# Patient Record
Sex: MALE | Race: BLACK OR AFRICAN AMERICAN
[De-identification: names, ages, dates, MRNs, and addresses within clinical notes are randomized per-mention and may not be internally consistent; named-entity substitution may affect disease eponyms.]

---

## 2019-06-18 ENCOUNTER — HOSPITAL ENCOUNTER (EMERGENCY)
Dept: HOSPITAL 97 - ER | Age: 44
Discharge: HOME | End: 2019-06-18
Payer: SELF-PAY

## 2019-06-18 DIAGNOSIS — R42: ICD-10-CM

## 2019-06-18 DIAGNOSIS — T67.6XXA: Primary | ICD-10-CM

## 2019-06-18 PROCEDURE — 81003 URINALYSIS AUTO W/O SCOPE: CPT

## 2019-06-18 PROCEDURE — 99283 EMERGENCY DEPT VISIT LOW MDM: CPT

## 2019-06-18 NOTE — ER
Nurse's Notes                                                                                     

 [facility]                                                                 

Name: Dhaval Loyd                                                                               

Age: 43 yrs                                                                                       

Sex: Male                                                                                         

: 1975                                                                                   

MRN: [id number]                                                                                   

Arrival Date: 2019                                                                          

Time: 11:23                                                                                       

Account#: [id number]                                                                            

Bed 20                                                                                            

Private MD:                                                                                       

Diagnosis: Dizziness and giddiness;Heat fatigue, transient                                        

                                                                                                  

Presentation:                                                                                     

                                                                                             

11:23 Presenting complaint: Patient states: i was at work and was assigned on another           

      facility, they don't have a good ventilation, i started feeling dizzy and i wanted to       

      throw up; i requested to see a doctor;. Transition of care: patient was not received        

      from another setting of care. Onset of symptoms was 2019. Risk Assessment: Do      

      you want to hurt yourself or someone else? Patient reports no desire to harm self or        

      others. Initial Sepsis Screen: Does the patient meet any 2 criteria? No. Patient's          

      initial sepsis screen is negative. Does the patient have a suspected source of              

      infection? No. Patient's initial sepsis screen is negative. Care prior to arrival: None.    

11:23 Method Of Arrival: Ambulatory                                                             

11:23 Acuity: KESHAV 4                                                                             

                                                                                                  

Triage Assessment:                                                                                

11:36 General: Appears in no apparent distress. slender, Behavior is calm, cooperative,       tw2 

      appropriate for age.                                                                        

11:44 Pain: Denies pain.                                                                      tw2 

                                                                                                  

Historical:                                                                                       

- Allergies:                                                                                      

11:25 No Known Allergies;                                                                       

- Home Meds:                                                                                      

11:25 None [Active];                                                                            

- PMHx:                                                                                           

11:25 None;                                                                                     

- PSHx:                                                                                           

11:25 None;                                                                                     

                                                                                                  

- Immunization history:: Adult Immunizations.                                                     

- Social history:: Smoking status: .                                                              

- Ebola Screening: : Patient denies travel to an Ebola-affected area in the 21 days               

  before illness onset.                                                                           

- Family history:: not pertinent.                                                                 

                                                                                                  

                                                                                                  

Screenin:35 Abuse screen: Denies threats or abuse. Nutritional screening: No deficits noted.        tw2 

      Tuberculosis screening: No symptoms or risk factors identified. Fall Risk None              

      identified.                                                                                 

                                                                                                  

Assessment:                                                                                       

11:35 General: Appears in no apparent distress. slender, Behavior is calm, cooperative,       tw2 

      appropriate for age. Pain: Denies pain. Neuro: Level of Consciousness is awake, alert,      

      obeys commands, Oriented to person, place, time, situation. Cardiovascular: Patient's       

      skin is warm and dry. Respiratory: Airway is patent Respiratory effort is even,             

      unlabored, Respiratory pattern is regular, symmetrical. GI: No signs and/or symptoms        

      were reported involving the gastrointestinal system. : No signs and/or symptoms were      

      reported regarding the genitourinary system. EENT: No signs and/or symptoms were            

      reported regarding the EENT system. Derm: No signs and/or symptoms reported regarding       

      the dermatologic system. Musculoskeletal: Range of motion: intact in all extremities.       

12:03 Reassessment: Patient appears in no apparent distress at this time. No changes from     tw2 

      previously documented assessment. Patient and/or family updated on plan of care and         

      expected duration. Pain level reassessed. Patient is alert, oriented x 3, equal             

      unlabored respirations, skin warm/dry/pink.                                                 

                                                                                                  

Vital Signs:                                                                                      

11:25  / 71; Pulse 51; Resp 18; Temp 98.2(O); Pulse Ox 99% on R/A; Weight 86.18 kg;     hj  

      Height 6 ft. 1 in. (185.42 cm); Pain 0/10;                                                  

11:25 Body Mass Index 25.07 (86.18 kg, 185.42 cm)                                               

                                                                                                  

ED Course:                                                                                        

11:23 Patient arrived in ED.                                                                  mr  

11:24 Triage completed.                                                                       hj  

11:25 Arm band placed on right wrist.                                                           

11:33 Jordan Del Real MD is Attending Physician.                                             [facility] 

11:35 Isabella Joe, RN is Primary Nurse.                                                        tw2 

11:35 Bed in low position. Call light in reach.                                               tw2 

11:44 Urine Dipstick--Ancillary (enter results) Sent.                                         tw2 

12:04 No provider procedures requiring assistance completed. Patient did not have IV access   tw2 

      during this emergency room visit.                                                           

                                                                                                  

Administered Medications:                                                                         

No medications were administered                                                                  

                                                                                                  

                                                                                                  

Outcome:                                                                                          

11:55 Discharge ordered by MD.                                                                [facility] 

12:04 Discharged to home ambulatory.                                                          tw2 

12:04 Condition: stable                                                                           

12:04 Discharge instructions given to patient, Instructed on discharge instructions, follow       

      up and referral plans. Demonstrated understanding of instructions, follow-up care.          

12:04 Patient left the ED.                                                                    tw2 

                                                                                                  

Signatures:                                                                                       

Jordan Del Real MD MD cha Rivera, Mary                                 mr MylesCristopher RN RN hj Wise, Tara, RN                          RN   tw2                                                  

                                                                                                  

Corrections: (The following items were deleted from the chart)                                    

12:01 11:23 Acuity: KESHAV 3 [facility]  

                                                                                                  

**************************************************************************************************

## 2019-06-18 NOTE — EDPHYS
Physician Documentation                                                                           

 [facility]                                                                 

Name: Dhaval Loyd                                                                               

Age: 43 yrs                                                                                       

Sex: Male                                                                                         

: 1975                                                                                   

MRN: [id number]                                                                                   

Arrival Date: 2019                                                                          

Time: 11:23                                                                                       

Account#: [id number]                                                                            

Bed 20                                                                                            

Private MD:                                                                                       

ED Physician Jordan Del Rael                                                                      

HPI:                                                                                              

                                                                                             

11:52 This 43 yrs old Black Male presents to ER via Ambulatory with complaints of Dizziness.  markel 

11:52 The patient presents with dizziness. Onset: The symptoms/episode began/occurred just    markel 

      prior to arrival. Context: occurred while the patient was working, in heat, no              

      ventilation. Modifying factors: The symptoms are alleviated by getting in cool              

      enviroment. Associated signs and symptoms: The patient has no apparent associated signs     

      or symptoms. Severity of symptoms: At their worst the symptoms were mild in the             

      emergency department the symptoms are unchanged. Patient's baseline: Neuro: alert and       

      fully oriented. The patient has not experienced similar symptoms in the past.               

                                                                                                  

Historical:                                                                                       

- Allergies:                                                                                      

11:25 No Known Allergies;                                                                     hj  

- Home Meds:                                                                                      

11:25 None [Active];                                                                          hj  

- PMHx:                                                                                           

11:25 None;                                                                                   hj  

- PSHx:                                                                                           

11:25 None;                                                                                   hj  

                                                                                                  

- Immunization history:: Adult Immunizations.                                                     

- Social history:: Smoking status: .                                                              

- Ebola Screening: : Patient denies travel to an Ebola-affected area in the 21 days               

  before illness onset.                                                                           

- Family history:: not pertinent.                                                                 

                                                                                                  

                                                                                                  

ROS:                                                                                              

11:52 Constitutional: Negative for fever, chills, and weight loss, Eyes: Negative for injury, markel 

      pain, redness, and discharge, ENT: Negative for injury, pain, and discharge, Neck:          

      Negative for injury, pain, and swelling, Cardiovascular: Negative for chest pain,           

      palpitations, and edema, Respiratory: Negative for shortness of breath, cough,              

      wheezing, and pleuritic chest pain, Abdomen/GI: Negative for abdominal pain, nausea,        

      vomiting, diarrhea, and constipation, Back: Negative for injury and pain, : Negative      

      for injury, bleeding, discharge, and swelling, MS/Extremity: Negative for injury and        

      deformity, Skin: Negative for injury, rash, and discoloration, Psych: Negative for          

      depression, anxiety, suicide ideation, homicidal ideation, and hallucinations,              

      Allergy/Immunology: Negative for hives, rash, and allergies, Endocrine: Negative for        

      neck swelling, polydipsia, polyuria, polyphagia, and marked weight changes,                 

      Hematologic/Lymphatic: Negative for swollen nodes, abnormal bleeding, and unusual           

      bruising.                                                                                   

11:52 Neuro: Positive for dizziness.                                                              

                                                                                                  

Exam:                                                                                             

11:52 Constitutional:  This is a well developed, well nourished patient who is awake, alert,  markel 

      and in no acute distress. Head/Face:  Normocephalic, atraumatic. Eyes:  Pupils equal        

      round and reactive to light, extra-ocular motions intact.  Lids and lashes normal.          

      Conjunctiva and sclera are non-icteric and not injected.  Cornea within normal limits.      

      Periorbital areas with no swelling, redness, or edema. ENT:  Nares patent. No nasal         

      discharge, no septal abnormalities noted.  Tympanic membranes are normal and external       

      auditory canals are clear.  Oropharynx with no redness, swelling, or masses, exudates,      

      or evidence of obstruction, uvula midline.  Mucous membranes moist. Neck:  Trachea          

      midline, no thyromegaly or masses palpated, and no cervical lymphadenopathy.  Supple,       

      full range of motion without nuchal rigidity, or vertebral point tenderness.  No            

      Meningismus. Chest/axilla:  Normal chest wall appearance and motion.  Nontender with no     

      deformity.  No lesions are appreciated. Cardiovascular:  Regular rate and rhythm with a     

      normal S1 and S2.  No gallops, murmurs, or rubs.  Normal PMI, no JVD.  No pulse             

      deficits. Respiratory:  Lungs have equal breath sounds bilaterally, clear to                

      auscultation and percussion.  No rales, rhonchi or wheezes noted.  No increased work of     

      breathing, no retractions or nasal flaring. Abdomen/GI:  Soft, non-tender, with normal      

      bowel sounds.  No distension or tympany.  No guarding or rebound.  No evidence of           

      tenderness throughout. Back:  No spinal tenderness.  No costovertebral tenderness.          

      Full range of motion. Male :  Normal genitalia with no discharge or lesions. Skin:        

      Warm, dry with normal turgor.  Normal color with no rashes, no lesions, and no evidence     

      of cellulitis. MS/ Extremity:  Pulses equal, no cyanosis.  Neurovascular intact.  Full,     

      normal range of motion. Neuro:  Awake and alert, GCS 15, oriented to person, place,         

      time, and situation.  Cranial nerves II-XII grossly intact.  Motor strength 5/5 in all      

      extremities.  Sensory grossly intact.  Cerebellar exam normal.  Normal gait. Psych:         

      Awake, alert, with orientation to person, place and time.  Behavior, mood, and affect       

      are within normal limits.                                                                   

11:52 Musculoskeletal/extremity: DVT Exam: No signs of deep vein thrombosis. no pain, no          

      swelling, no tenderness, negative Homans' sign noted on exam, no appreciated bluish         

      discoloration, no erythema, no increased warmth.                                            

                                                                                                  

Vital Signs:                                                                                      

11:25  / 71; Pulse 51; Resp 18; Temp 98.2(O); Pulse Ox 99% on R/A; Weight 86.18 kg;     hj  

      Height 6 ft. 1 in. (185.42 cm); Pain 0/10;                                                  

11:25 Body Mass Index 25.07 (86.18 kg, 185.42 cm)                                               

                                                                                                  

MDM:                                                                                              

11:33 Patient medically screened.                                                             [facility] 

11:54 Data reviewed: vital signs, nurses notes.                                               [facility] 

                                                                                                  

                                                                                             

11:42 Order name: Urine Dipstick--Ancillary (enter results)                                     

                                                                                             

11:44 Order name: Urine Dipstick-Ancillary                                                    EDMS

                                                                                                  

Administered Medications:                                                                         

No medications were administered                                                                  

                                                                                                  

                                                                                                  

Disposition:                                                                                      

19 11:55 Discharged to Home. Impression: Dizziness and giddiness, Heat fatigue,             

  transient.                                                                                      

- Condition is Stable.                                                                            

- Discharge Instructions: Dizziness, Heat Exhaustion Information, Dizziness,                      

  Easy-to-Read.                                                                                   

                                                                                                  

- Medication Reconciliation Form, Thank You Letter, Antibiotic Education, Prescription            

  Opioid Use, Work release form form.                                                             

- Follow up: Private Physician; When: 2 - 3 days; Reason: Recheck today's complaints,             

  Continuance of care, Re-evaluation by your physician.                                           

- Problem is new.                                                                                 

- Symptoms have improved.                                                                         

                                                                                                  

                                                                                                  

                                                                                                  

Signatures:                                                                                       

Dispatcher [employer]                           EDMS                                                 

Jordan Del Real MD MD cha Joaquin, Henry, RN                      RN                                                      

Isabella Joe RN                          RN   tw2                                                  

                                                                                                  

Corrections: (The following items were deleted from the chart)                                    

12:04 11:55 2019 11:55 Discharged to Home. Impression: Dizziness and giddiness; Heat    tw2 

      fatigue, transient. Condition is Stable. Forms are Work release form, Medication            

      Reconciliation Form, Thank You Letter, Antibiotic Education, Prescription Opioid Use.       

      Follow up: Private Physician; When: 2 - 3 days; Reason: Recheck today's complaints,         

      Continuance of care, Re-evaluation by your physician. Problem is new. Symptoms have         

      improved. [facility]                                                                               

                                                                                                  

**************************************************************************************************